# Patient Record
Sex: MALE | Race: AMERICAN INDIAN OR ALASKA NATIVE | HISPANIC OR LATINO | URBAN - NONMETROPOLITAN AREA
[De-identification: names, ages, dates, MRNs, and addresses within clinical notes are randomized per-mention and may not be internally consistent; named-entity substitution may affect disease eponyms.]

---

## 2024-05-28 ENCOUNTER — CLAIMS CREATED FROM THE CLAIM WINDOW (OUTPATIENT)
Dept: URBAN - NONMETROPOLITAN AREA CLINIC 2 | Facility: CLINIC | Age: 44
End: 2024-05-28

## 2024-05-28 ENCOUNTER — OFFICE VISIT (OUTPATIENT)
Dept: URBAN - NONMETROPOLITAN AREA CLINIC 2 | Facility: CLINIC | Age: 44
End: 2024-05-28
Payer: SELF-PAY

## 2024-05-28 ENCOUNTER — DASHBOARD ENCOUNTERS (OUTPATIENT)
Age: 44
End: 2024-05-28

## 2024-05-28 ENCOUNTER — LAB OUTSIDE AN ENCOUNTER (OUTPATIENT)
Dept: URBAN - NONMETROPOLITAN AREA CLINIC 2 | Facility: CLINIC | Age: 44
End: 2024-05-28

## 2024-05-28 VITALS
HEART RATE: 62 BPM | BODY MASS INDEX: 30.82 KG/M2 | DIASTOLIC BLOOD PRESSURE: 81 MMHG | WEIGHT: 185 LBS | SYSTOLIC BLOOD PRESSURE: 121 MMHG | HEIGHT: 65 IN

## 2024-05-28 DIAGNOSIS — K57.30 ACQUIRED DIVERTICULOSIS OF COLON: ICD-10-CM

## 2024-05-28 DIAGNOSIS — R10.32 LEFT LOWER QUADRANT PAIN: ICD-10-CM

## 2024-05-28 PROBLEM — 301716002: Status: ACTIVE | Noted: 2024-05-28

## 2024-05-28 PROBLEM — 397881000: Status: ACTIVE | Noted: 2024-05-28

## 2024-05-28 PROCEDURE — 99244 OFF/OP CNSLTJ NEW/EST MOD 40: CPT | Performed by: NURSE PRACTITIONER

## 2024-05-28 PROCEDURE — 99204 OFFICE O/P NEW MOD 45 MIN: CPT | Performed by: NURSE PRACTITIONER

## 2024-05-28 RX ORDER — HYOSCYAMINE SULFATE 0.12 MG/1
1 TABLET UNDER THE TONGUE AND ALLOW TO DISSOLVE AS NEEDED TABLET SUBLINGUAL
Qty: 60 TABLET | Refills: 11 | OUTPATIENT
Start: 2024-05-28 | End: 2025-05-23

## 2024-05-28 RX ORDER — OMEPRAZOLE 40 MG/1
TOME UNA C PSULA TODOS LOS D AS CAPSULE, DELAYED RELEASE ORAL
Qty: 30 EACH | Refills: 0 | Status: ACTIVE | COMMUNITY

## 2024-05-28 RX ORDER — SIMVASTATIN 40 MG/1
TOME UNA TABLETA TODOS LOS DIAS POR 90 DIAS, POR LA BOCA TABLET, FILM COATED ORAL
Qty: 90 EACH | Refills: 0 | Status: ACTIVE | COMMUNITY

## 2024-05-28 RX ORDER — PSYLLIUM HUSK 0.4 G
2 CAPSULES WITH 8 OUNCES OF LIQUID CAPSULE ORAL AT BEDTIME
Qty: 60 CAPSULES | Refills: 11 | OUTPATIENT
Start: 2024-05-28 | End: 2025-05-23

## 2024-05-28 RX ORDER — METFORMIN HYDROCHLORIDE 500 MG/1
1 TABLET WITH A MEAL TABLET, FILM COATED ORAL ONCE A DAY
Status: ACTIVE | COMMUNITY

## 2024-05-29 LAB
A/G RATIO: 1.9
ABSOLUTE BASOPHILS: 42
ABSOLUTE EOSINOPHILS: 345
ABSOLUTE LYMPHOCYTES: 2003
ABSOLUTE MONOCYTES: 260
ABSOLUTE NEUTROPHILS: 2650
ALBUMIN: 4.5
ALKALINE PHOSPHATASE: 76
ALT (SGPT): 22
AST (SGOT): 16
BASOPHILS: 0.8
BILIRUBIN, TOTAL: 0.5
BUN/CREATININE RATIO: (no result)
BUN: 13
C-REACTIVE PROTEIN, QUANT: <3
CALCIUM: 9
CARBON DIOXIDE, TOTAL: 27
CHLORIDE: 104
CREATININE: 0.82
EGFR: 112
EOSINOPHILS: 6.5
GLOBULIN, TOTAL: 2.4
GLUCOSE: 133
HEMATOCRIT: 46.7
HEMOGLOBIN: 16.1
LYMPHOCYTES: 37.8
MCH: 33.6
MCHC: 34.5
MCV: 97.5
MONOCYTES: 4.9
MPV: 12.1
NEUTROPHILS: 50
PLATELET COUNT: 167
POTASSIUM: 4.5
PROTEIN, TOTAL: 6.9
RDW: 12.3
RED BLOOD CELL COUNT: 4.79
SED RATE BY MODIFIED: 2
SODIUM: 139
WHITE BLOOD CELL COUNT: 5.3

## 2024-07-17 ENCOUNTER — OFFICE VISIT (OUTPATIENT)
Dept: URBAN - NONMETROPOLITAN AREA SURGERY CENTER 1 | Facility: SURGERY CENTER | Age: 44
End: 2024-07-17

## 2024-08-30 ENCOUNTER — OFFICE VISIT (OUTPATIENT)
Dept: URBAN - NONMETROPOLITAN AREA CLINIC 11 | Facility: CLINIC | Age: 44
End: 2024-08-30

## 2024-08-30 RX ORDER — HYOSCYAMINE SULFATE 0.12 MG/1
1 TABLET UNDER THE TONGUE AND ALLOW TO DISSOLVE AS NEEDED TABLET SUBLINGUAL
Qty: 60 TABLET | Refills: 11 | COMMUNITY
Start: 2024-05-28 | End: 2025-05-23

## 2024-08-30 RX ORDER — METFORMIN HYDROCHLORIDE 500 MG/1
1 TABLET WITH A MEAL TABLET, FILM COATED ORAL ONCE A DAY
COMMUNITY

## 2024-08-30 RX ORDER — SIMVASTATIN 40 MG/1
1 TABLET IN THE EVENING TABLET, FILM COATED ORAL
Refills: 0 | COMMUNITY

## 2024-08-30 RX ORDER — PSYLLIUM HUSK 0.4 G
2 CAPSULES WITH 8 OUNCES OF LIQUID CAPSULE ORAL AT BEDTIME
Qty: 60 CAPSULES | Refills: 11 | COMMUNITY
Start: 2024-05-28 | End: 2025-05-23

## 2024-08-30 RX ORDER — OMEPRAZOLE 40 MG/1
1 CAPSULE 30 MINUTES BEFORE MEAL CAPSULE, DELAYED RELEASE ORAL ONCE A DAY
Refills: 0 | COMMUNITY

## 2024-08-30 NOTE — HPI-TODAY'S VISIT:
5/28/2024 The patient is a 43-year-old male referred to me for consultation of lower abdominal pain.  A few weeks ago he developed left lower quadrant abdominal pain.  He was diagnosed with diverticulitis by what sounds like an urgent care.  He was treated with antibiotics with some improvement.  He still has some left lower quadrant abdominal discomfort that is relieved with a bowel movement.  He is never had a colonoscopy.  He denies any rectal bleeding.  He has not had any recent labs.  Today he agrees to start low-dose psyllium, will add Levsin for spasm.  Schedule colonoscopy for further evaluation.  MB

## 2024-09-03 ENCOUNTER — OFFICE VISIT (OUTPATIENT)
Dept: URBAN - NONMETROPOLITAN AREA CLINIC 2 | Facility: CLINIC | Age: 44
End: 2024-09-03

## 2024-09-25 ENCOUNTER — OFFICE VISIT (OUTPATIENT)
Dept: URBAN - NONMETROPOLITAN AREA SURGERY CENTER 1 | Facility: SURGERY CENTER | Age: 44
End: 2024-09-25

## 2024-09-25 RX ORDER — PSYLLIUM HUSK 0.4 G
2 CAPSULES WITH 8 OUNCES OF LIQUID CAPSULE ORAL AT BEDTIME
Qty: 60 CAPSULES | Refills: 11 | COMMUNITY
Start: 2024-05-28 | End: 2025-05-23

## 2024-09-25 RX ORDER — OMEPRAZOLE 40 MG/1
1 CAPSULE 30 MINUTES BEFORE MEAL CAPSULE, DELAYED RELEASE ORAL ONCE A DAY
Refills: 0 | COMMUNITY

## 2024-09-25 RX ORDER — HYOSCYAMINE SULFATE 0.12 MG/1
1 TABLET UNDER THE TONGUE AND ALLOW TO DISSOLVE AS NEEDED TABLET SUBLINGUAL
Qty: 60 TABLET | Refills: 11 | COMMUNITY
Start: 2024-05-28 | End: 2025-05-23

## 2024-09-25 RX ORDER — SIMVASTATIN 40 MG/1
1 TABLET IN THE EVENING TABLET, FILM COATED ORAL
Refills: 0 | COMMUNITY

## 2024-09-25 RX ORDER — METFORMIN HYDROCHLORIDE 500 MG/1
1 TABLET WITH A MEAL TABLET, FILM COATED ORAL ONCE A DAY
COMMUNITY

## 2024-09-27 ENCOUNTER — OFFICE VISIT (OUTPATIENT)
Dept: URBAN - NONMETROPOLITAN AREA SURGERY CENTER 1 | Facility: SURGERY CENTER | Age: 44
End: 2024-09-27

## 2024-10-18 ENCOUNTER — OFFICE VISIT (OUTPATIENT)
Dept: URBAN - NONMETROPOLITAN AREA CLINIC 2 | Facility: CLINIC | Age: 44
End: 2024-10-18
Payer: SELF-PAY

## 2024-10-18 VITALS
SYSTOLIC BLOOD PRESSURE: 124 MMHG | BODY MASS INDEX: 29.39 KG/M2 | HEIGHT: 65 IN | WEIGHT: 176.4 LBS | DIASTOLIC BLOOD PRESSURE: 86 MMHG | HEART RATE: 66 BPM

## 2024-10-18 DIAGNOSIS — R10.32 LEFT LOWER QUADRANT PAIN: ICD-10-CM

## 2024-10-18 PROBLEM — 305058001: Status: ACTIVE | Noted: 2024-10-18

## 2024-10-18 PROCEDURE — 99214 OFFICE O/P EST MOD 30 MIN: CPT | Performed by: NURSE PRACTITIONER

## 2024-10-18 RX ORDER — PSYLLIUM HUSK 0.4 G
2 CAPSULES WITH 8 OUNCES OF LIQUID CAPSULE ORAL AT BEDTIME
Qty: 60 CAPSULES | Refills: 11 | COMMUNITY
Start: 2024-05-28 | End: 2025-05-23

## 2024-10-18 RX ORDER — OMEPRAZOLE 40 MG/1
1 CAPSULE 30 MINUTES BEFORE MEAL CAPSULE, DELAYED RELEASE ORAL ONCE A DAY
Refills: 0 | COMMUNITY

## 2024-10-18 RX ORDER — HYOSCYAMINE SULFATE 0.12 MG/1
1 TABLET UNDER THE TONGUE AND ALLOW TO DISSOLVE AS NEEDED TABLET SUBLINGUAL
Qty: 60 TABLET | Refills: 11 | COMMUNITY
Start: 2024-05-28 | End: 2025-05-23

## 2024-10-18 RX ORDER — SIMVASTATIN 40 MG/1
1 TABLET IN THE EVENING TABLET, FILM COATED ORAL
Refills: 0 | COMMUNITY

## 2024-10-18 RX ORDER — HYOSCYAMINE SULFATE 0.12 MG/1
1 TABLET UNDER THE TONGUE AND ALLOW TO DISSOLVE AS NEEDED TABLET SUBLINGUAL
Qty: 90 | Refills: 3 | OUTPATIENT
Start: 2024-10-18 | End: 2025-02-14

## 2024-10-18 RX ORDER — METFORMIN HYDROCHLORIDE 500 MG/1
1 TABLET WITH A MEAL TABLET, FILM COATED ORAL ONCE A DAY
Status: ACTIVE | COMMUNITY

## 2024-10-18 NOTE — HPI-TODAY'S VISIT:
5/28/2024 The patient is a 43-year-old male referred to me for consultation of lower abdominal pain.  A few weeks ago he developed left lower quadrant abdominal pain.  He was diagnosed with diverticulitis by what sounds like an urgent care.  He was treated with antibiotics with some improvement.  He still has some left lower quadrant abdominal discomfort that is relieved with a bowel movement.  He is never had a colonoscopy.  He denies any rectal bleeding.  He has not had any recent labs.  Today he agrees to start low-dose psyllium, will add Levsin for spasm.  Schedule colonoscopy for further evaluation.  MB 10/18/2024 The patient presents for colonoscopy follow-up.  His colonoscopy is normal.  He is not taking the fiber.  He did not get the prescription for the Levsin.  He still has occasional left lower quadrant abdominal pain but no sharon diverticulosis.  Overall his symptoms have improved, he still has occasional discomfort, he agrees to start psyllium and Levsin as needed for spasm.  MB

## 2025-04-24 ENCOUNTER — OFFICE VISIT (OUTPATIENT)
Dept: URBAN - NONMETROPOLITAN AREA CLINIC 2 | Facility: CLINIC | Age: 45
End: 2025-04-24